# Patient Record
Sex: FEMALE
[De-identification: names, ages, dates, MRNs, and addresses within clinical notes are randomized per-mention and may not be internally consistent; named-entity substitution may affect disease eponyms.]

---

## 2017-07-02 ENCOUNTER — HOSPITAL ENCOUNTER (EMERGENCY)
Dept: HOSPITAL 14 - H.ER | Age: 22
Discharge: HOME | End: 2017-07-02
Payer: SELF-PAY

## 2017-07-02 VITALS
SYSTOLIC BLOOD PRESSURE: 118 MMHG | HEART RATE: 64 BPM | RESPIRATION RATE: 18 BRPM | DIASTOLIC BLOOD PRESSURE: 65 MMHG | TEMPERATURE: 98.4 F | OXYGEN SATURATION: 100 %

## 2017-07-02 DIAGNOSIS — Z32.02: ICD-10-CM

## 2017-07-02 DIAGNOSIS — N89.8: Primary | ICD-10-CM

## 2017-07-02 NOTE — ED PDOC
HPI: Female  Pain


Time Seen by Provider: 07/02/17 17:34


Chief Complaint (Nursing): Female Genitourinary


Chief Complaint (Provider): CONCERN FOR PREGNANCY


History Per: Patient (23 y/o female here for pregnancy test.  States she has 

not had period and is concerned.  Notes she was seen at [facility] [place] prior via 

ambulance ED visit for evaluation of sudden onset abdominal pain but does not 

know results to US.  Notes vaginal discharge.)





Past Medical History


Reviewed: Historical Data, Nursing Documentation, Vital Signs


Vital Signs: 





 Last Vital Signs











Temp  98.4 F   07/02/17 17:35


 


Pulse  64   07/02/17 17:35


 


Resp  18   07/02/17 17:35


 


BP  118/65   07/02/17 17:35


 


Pulse Ox  100   07/02/17 17:35














- Family History


Family History: States: No Known Family Hx





- Allergies


Allergies/Adverse Reactions: 


 Allergies











Allergy/AdvReac Type Severity Reaction Status Date / Time


 


shellfish derived Allergy  RASH Verified 07/02/17 17:34














Review of Systems


ROS Statement: Except As Marked, All Systems Reviewed And Found Negative





Physical Exam





- Reviewed


Nursing Documentation Reviewed: Yes


Vital Signs Reviewed: Yes





- Physical Exam


Appears: Positive for: Well, Non-toxic, No Acute Distress


Head Exam: Positive for: ATRAUMATIC, NORMAL INSPECTION, NORMOCEPHALIC


Skin: Positive for: Normal Color, Warm, DRY


Eye Exam: Positive for: EOMI, Normal appearance, PERRL


ENT: Positive for: Normal ENT Inspection


Neck: Positive for: Normal, Painless ROM


Cardiovascular/Chest: Positive for: Regular Rate, Rhythm


Respiratory: Positive for: CNT, Normal Breath Sounds


Gastrointestinal/Abdominal: Positive for: Normal Exam, Bowel Sounds, Soft


Back: Positive for: Normal Inspection


Extremity: Positive for: Normal ROM


Neurologic/Psych: Positive for: Alert, Oriented





- Laboratory Results


Urine Pregnancy POC: Negative


Urine dip results: Negative for: Leukocyte Esterase, Blood, Nitrate, Ketones, 

Glucose, Bilirubin, Protein





- ECG


O2 Sat by Pulse Oximetry: 100





Disposition





- Clinical Impression


Clinical Impression: 


 Pregnancy test negative








- Patient ED Disposition


Is Patient to be Admitted: No





- Disposition


[facility] 


[facility]


Disposition: Routine/Home


Disposition Time: 18:26


Condition: FAIR


Additional Instructions: 


PLEASE F/U WITH [facility] MEDICAL RECORDS FOR RESULTS OF 

ULTRASOUND REPORT.


Instructions:  Normal Exam (ED)

## 2019-01-31 ENCOUNTER — HOSPITAL ENCOUNTER (INPATIENT)
Dept: HOSPITAL 74 - JLDR | Age: 24
LOS: 1 days | Discharge: HOME | End: 2019-02-01
Payer: COMMERCIAL

## 2019-02-01 ENCOUNTER — HOSPITAL ENCOUNTER (INPATIENT)
Dept: HOSPITAL 74 - JLDR | Age: 24
LOS: 2 days | Discharge: HOME | DRG: 560 | End: 2019-02-03
Attending: OBSTETRICS & GYNECOLOGY | Admitting: OBSTETRICS & GYNECOLOGY
Payer: COMMERCIAL

## 2019-02-01 VITALS — BODY MASS INDEX: 29.9 KG/M2

## 2019-02-01 DIAGNOSIS — Z3A.40: ICD-10-CM

## 2019-02-01 PROCEDURE — 0HQ9XZZ REPAIR PERINEUM SKIN, EXTERNAL APPROACH: ICD-10-PCS | Performed by: OBSTETRICS & GYNECOLOGY

## 2019-02-01 RX ADMIN — ACETAMINOPHEN PRN MG: 325 TABLET ORAL at 19:44

## 2019-02-01 RX ADMIN — IBUPROFEN PRN MG: 600 TABLET, FILM COATED ORAL at 09:50

## 2019-02-01 RX ADMIN — Medication SCH: at 10:33

## 2019-02-01 RX ADMIN — AMPICILLIN SCH MLS/HR: 1 INJECTION, POWDER, FOR SOLUTION INTRAMUSCULAR; INTRAVENOUS at 04:50

## 2019-02-01 RX ADMIN — ACETAMINOPHEN PRN MG: 325 TABLET ORAL at 09:50

## 2019-02-01 RX ADMIN — IBUPROFEN PRN MG: 600 TABLET, FILM COATED ORAL at 19:44

## 2019-02-01 RX ADMIN — AMPICILLIN SCH: 1 INJECTION, POWDER, FOR SOLUTION INTRAMUSCULAR; INTRAVENOUS at 11:58

## 2019-02-01 NOTE — PN
Delivery





- Delivery


Vaginal Delivery: No Problems


Type of Anesthesia: Epidural


Episiotomy/Laceration: 1st degree


EBL (cc): 250





Delivery, Single Birth





- Stages of Labor


Date 1st Stage Initiatied: 19


Date 2nd Stage Initiated: 19


Date of Delivery: 19


Placenta: Yes: Spontaneous





- Condition of Infant


Pediatrician/Neonatologist Present: No


Infant Gender: Female


Position: Left, OA





- Apgar


  ** 1 Minute


Apgar Total Score: 9





  ** 5 Minutes


Apgar Total Score: 9





-  Feeding Plan


Initial Plan: Elected not to breastfeed exclusively throughout hospitalization





Remarks





- Remarks


Remarks: 





Uncomplicated  of baby girl across 1st degree vaginal laceration from JAJA 

Position


nuchal cord noted after delivery of head, clamped and cut at perineum


anterior shoulder (right) delivered with ease along with remainder of 


mouth and nose bulb suctioned


placenta delivered with 3VC and in tact


1st degree laceration repaired with 2-0 chromic in a single figure of 8 suture


sponge and needle count correct


mom stable


baby to well baby nursery

## 2019-02-01 NOTE — HP
Past Medical History





- Admission


Chief Complaint: Rupture of membrane


History of Present Illness: 





22 yo , @ 40 weeks gestation, admitted for rupture of membrane.





History Source: Patient


Limitations to Obtaining History: No Limitations





- Past Medical History


...: 2


...Para: 0


...Term: 0


...: 0


...Spon : 0


...Induced : 1


...Multiple Gestation: 0


...LMP: 18


... Weeks Gestation by Dates: 42.1


...EDC by Dates: 19


...EDC by Sono: 19





- Past Surgical History


Past Surgical History: Yes: None


Hx Myomectomy: No


Hx Transabdominal Cerclage: No





- Smoking History


Smoking history: Never smoked


Have you smoked in the past 12 months: No





- Alcohol/Substance Use


Hx Alcohol Use: No


History of Substance Use: reports: None





- Social History


History of Recent Travel: No





Home Medications





- Allergies


Allergies/Adverse Reactions: 


 Allergies











Allergy/AdvReac Type Severity Reaction Status Date / Time


 


shellfish derived Allergy Severe Itching Verified 19 18:37














- Home Medications


Home Medications: 


Ambulatory Orders





Albuterol Sulfate Inhaler - [Ventolin Hfa Inhaler -] 1 - 2 inh PO QID 19 


Prenatal Vitamins (Sjr) - 1 tab PO DAILY 19 











Family Disease History





- Family Disease History


Family History: Unremarkable





Review of Systems





- Review of Systems


Constitutional: reports: No Symptoms


Eyes: reports: No Symptoms


HENT: reports: No Symptoms


Neck: reports: No Symptoms


Cardiovascular: reports: No Symptoms


Respiratory: reports: No Symptoms


Gastrointestinal: reports: No Symptoms


Genitourinary: reports: Pain, Other (Leaking of fluid)


Breasts: reports: No Symptoms Reported


Musculoskeletal: reports: No Symptoms


Integumentary: reports: No Symptoms


Neurological: reports: No Symptoms


Endocrine: reports: No Symptoms


Hematology/Lymphatic: reports: No Symptoms


Psychiatric: reports: No Symptoms


Pain Intensity: 5





Physical Exam - Maternity


Vital Signs: 


 Vital Signs











Temperature  98.4 F   19 06:00


 


Pulse Rate  98 H  19 07:00


 


Respiratory Rate  18   19 07:00


 


Blood Pressure  139/93   19 07:00


 


O2 Sat by Pulse Oximetry (%)  99   19 07:00











Constitutional: Yes: Well Nourished


Eyes: Yes: Conjunctiva Clear


HENT: Yes: Atraumatic


Neck: Yes: Supple


Cardiovascular: Yes: Regular Rate and Rhythm


Lungs: Clear to auscultation





- Abdominal Exam/OB


Number of Fetuses: Single


Fetal Presentation: Vertex





- Vaginal Exam/OB


Dilatation (cm): 3


Effacement (%): 80


Amniotic Membrane Status: Leaking


Fetal Station: -1





- Physical Exam


Musculoskeletal: Yes: WNL


Extremities: Yes: WNL


...Motor Strength: WNL


Psychiatric: Yes: Alert, Oriented





Problem List





- Problems


(1) 40 weeks gestation of pregnancy


Code(s): Z3A.40 - 40 WEEKS GESTATION OF PREGNANCY   





(2) Rupture of membranes with clear amniotic fluid


Code(s): YVF3046 -    





Assessment/Plan





40 weeks gestation


Spontaneous rupture of membrane


Admit to L&D


Analgesia as needed


Anticipate

## 2019-02-02 LAB
BASOPHILS # BLD: 0.3 % (ref 0–2)
DEPRECATED RDW RBC AUTO: 14.6 % (ref 11.6–15.6)
EOSINOPHIL # BLD: 0.6 % (ref 0–4.5)
HCT VFR BLD CALC: 30.2 % (ref 32.4–45.2)
HGB BLD-MCNC: 10.1 GM/DL (ref 10.7–15.3)
LYMPHOCYTES # BLD: 17.5 % (ref 8–40)
MCH RBC QN AUTO: 29.3 PG (ref 25.7–33.7)
MCHC RBC AUTO-ENTMCNC: 33.6 G/DL (ref 32–36)
MCV RBC: 87.4 FL (ref 80–96)
MONOCYTES # BLD AUTO: 5.9 % (ref 3.8–10.2)
NEUTROPHILS # BLD: 75.7 % (ref 42.8–82.8)
PLATELET # BLD AUTO: 198 K/MM3 (ref 134–434)
PMV BLD: 9.1 FL (ref 7.5–11.1)
RBC # BLD AUTO: 3.46 M/MM3 (ref 3.6–5.2)
WBC # BLD AUTO: 16.4 K/MM3 (ref 4–10)

## 2019-02-02 RX ADMIN — Medication SCH TAB: at 10:36

## 2019-02-02 RX ADMIN — ACETAMINOPHEN PRN MG: 325 TABLET ORAL at 20:54

## 2019-02-02 RX ADMIN — IBUPROFEN PRN MG: 600 TABLET, FILM COATED ORAL at 20:54

## 2019-02-02 NOTE — PN
Post Partum Note





- Post Partum


Date of Delivery: 19


Vital Signs: 


 Vital Signs - 24 hr











  19





  07:30 07:45 08:00


 


Temperature   


 


Pulse Rate 98 H 100 H 109 H


 


Respiratory 18 18 18





Rate   


 


Blood Pressure 140/94 147/105 H 135/85


 


O2 Sat by Pulse 100 100 100





Oximetry (%)   














  19





  08:15 09:10 09:25


 


Temperature   


 


Pulse Rate 103 H 110 H 104 H


 


Respiratory 18 18 18





Rate   


 


Blood Pressure 139/100 139/88 132/86


 


O2 Sat by Pulse 100 100 100





Oximetry (%)   














  19





  09:40 09:55 12:02


 


Temperature  98.1 F 98.3 F


 


Pulse Rate 100 H 87 90


 


Respiratory 18 18 20





Rate   


 


Blood Pressure 135/82 126/72 128/83


 


O2 Sat by Pulse 100 100 





Oximetry (%)   














  19





  14:00 18:00 22:00


 


Temperature 98.7 F 98.9 F 98.5 F


 


Pulse Rate 100 H 96 H 91 H


 


Respiratory 20 20 18





Rate   


 


Blood Pressure 120/50 L 114/86 107/63


 


O2 Sat by Pulse   





Oximetry (%)   














  19





  02:00 05:58


 


Temperature 98.2 F 98.7 F


 


Pulse Rate 86 96 H


 


Respiratory 18 18





Rate  


 


Blood Pressure 127/72 118/54 L


 


O2 Sat by Pulse  





Oximetry (%)  











Labs: 


 Laboratory Results - last 24 hr











  19





  10:34


 


Fetal Screen  Negative


 


Baby's Blood Type  O positive


 


Unit Expiration Date  














- Subjective


Subjective: No Complaints, Ambulating





- Objective


Afebrile: Yes


Breast: Not engorged


Abdomen: Soft, Non-tender


Uterus: Fundus firm


Vagina: Scant lochia


Extremities: Non-tender





- Assessment/Plan








(2)  (normal spontaneous vaginal delivery)


Assessment: S/P Normal    Plan: Routine Care

## 2019-02-03 VITALS — TEMPERATURE: 98.6 F | HEART RATE: 82 BPM | SYSTOLIC BLOOD PRESSURE: 124 MMHG | DIASTOLIC BLOOD PRESSURE: 68 MMHG

## 2019-02-03 RX ADMIN — ACETAMINOPHEN PRN MG: 325 TABLET ORAL at 10:55

## 2019-02-03 RX ADMIN — IBUPROFEN PRN MG: 600 TABLET, FILM COATED ORAL at 10:56

## 2019-02-03 RX ADMIN — Medication SCH TAB: at 10:00

## 2019-02-03 NOTE — DS
Physical Exam-GYN


Vital Signs: 


 Vital Signs











Temperature  98.4 F   19 22:00


 


Pulse Rate  77   19 22:00


 


Respiratory Rate  20   19 22:00


 


Blood Pressure  125/84   19 22:00


 


O2 Sat by Pulse Oximetry (%)  100   19 09:55











Constitutional: Yes: Well Nourished, No Distress


Gastrointestinal: Yes: WNL, Soft


....Post Partum: Yes: Uterus firm, Uterus non-tender


Breast(s): Yes: WNL


Musculoskeletal: Yes: WNL


Extremities: Yes: WNL


Edema: No


Neurological: Yes: WNL, Alert, Oriented


Labs: 


 CBC, BMP





 19 08:10 











Delivery





- Delivery


Vaginal Delivery: No Problems


Type of Anesthesia: Epidural


Episiotomy/Laceration: Perineal Extension/lac, 1st degree


EBL (cc): 250





Delivery, Single Birth





- Stages of Labor


Date 1st Stage Initiatied: 19


Time 1st Stage Initiated: 03:00


Date 2nd Stage Initiated: 19


Time 2nd Stage Initiated: 07:25


Date of Delivery: 19


Time of Delivery: 08:34


Time Placenta Delivered: 08:40


Placenta: Yes: Spontaneous





- Condition of Infant


Pediatrician/Neonatologist Present: No


Infant Gender: Female


Birth Weight: 6 lb


Position: Left, OA


Total Hours ROM (Hrs/Mins): 5hrs 40min





- Apgar


  ** 1 Minute


Apgar Total Score: 9





  ** 5 Minutes


Apgar Total Score: 9





- [place] Feeding Plan


Initial Plan: Elected not to breastfeed exclusively throughout hospitalization





Discharge Summary


Reason For Visit: LABOR


Current Active Problems





40 weeks gestation of pregnancy (Acute)


 (normal spontaneous vaginal delivery) (Acute)


 (normal spontaneous vaginal delivery) (Acute)


Rupture of membranes with clear amniotic fluid (Acute)








Procedures: Principal: Normal Vaginal Delivery


Condition: Good





- Instructions


Diet, Activity, Other Instructions: 


Dr. Marielle Hinkle Gyn discharge instructions 





Physical activity





Resume your normal everyday activity as tolerated no heavy lifting or exercise 

until seen by your surgeon. You may walk unlimited arnol of and climb stairs. 

You may resume driving the car when you feel safe and comfortable behind the 

wheel. No sexual activity as instructed by Dr. Hinkle.





Wound care


If you have a bandage, leave it on, and keep dry for 48-72 hours. After that 

time discard the outer bandage. If they are tapes on the skin under the out of 

bandage leave them in place. They will peel off in the next 7 to 10 days. Do 

Not Peel them off. You may shower the day after surgery. If there are tapes 

present on the skin, you may shower over them.





Diet


There are no dietary restrictions. Eat healthy, high-fiber foods. Drink 6 to 8 

glasses of liquid each day. This will assist in keeping your bowels are regular.





Pain management


You may take Tylenol or acetaminophen or Ibuprofen (for example, Motrin, Advil 

etc.) from my pain prescription medication is ordered should be taken as 

prescribed for moderate to severe pain.





Call Dr. Hinkle for any of the following:





Severe pain not relieved by medication


Fever of 101 or higher


Excessive bleeding or drainage on dressing


Inability to urinate








Call the office at [phone] for an appointment in seven days.








Disposition: HOME





- Home Medications


Comprehensive Discharge Medication List: 


Ambulatory Orders





Albuterol Sulfate Inhaler - [Ventolin Hfa Inhaler -] 1 - 2 inh PO QID 19 


Prenatal Vitamins (Sjr) - 1 tab PO DAILY 19 


Ibuprofen [Motrin -] 600 mg PO TID #21 tablet 19